# Patient Record
Sex: MALE | Race: OTHER | HISPANIC OR LATINO
[De-identification: names, ages, dates, MRNs, and addresses within clinical notes are randomized per-mention and may not be internally consistent; named-entity substitution may affect disease eponyms.]

---
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
## 2023-02-13 ENCOUNTER — TRANSCRIPTION ENCOUNTER (OUTPATIENT)
Age: 18
End: 2023-02-13

## 2023-02-13 ENCOUNTER — INPATIENT (INPATIENT)
Facility: HOSPITAL | Age: 18
LOS: 0 days | Discharge: HOME | DRG: 343 | End: 2023-02-14
Attending: STUDENT IN AN ORGANIZED HEALTH CARE EDUCATION/TRAINING PROGRAM | Admitting: STUDENT IN AN ORGANIZED HEALTH CARE EDUCATION/TRAINING PROGRAM
Payer: MEDICAID

## 2023-02-13 VITALS
DIASTOLIC BLOOD PRESSURE: 77 MMHG | OXYGEN SATURATION: 99 % | RESPIRATION RATE: 18 BRPM | HEART RATE: 80 BPM | SYSTOLIC BLOOD PRESSURE: 134 MMHG | TEMPERATURE: 97 F | WEIGHT: 315 LBS

## 2023-02-13 LAB
ALBUMIN SERPL ELPH-MCNC: 4 G/DL — SIGNIFICANT CHANGE UP (ref 3.3–5)
ALP SERPL-CCNC: 164 U/L — HIGH (ref 40–120)
ALT FLD-CCNC: 56 U/L — SIGNIFICANT CHANGE UP (ref 12–78)
ANION GAP SERPL CALC-SCNC: 6 MMOL/L — SIGNIFICANT CHANGE UP (ref 5–17)
APPEARANCE UR: CLEAR — SIGNIFICANT CHANGE UP
AST SERPL-CCNC: 27 U/L — SIGNIFICANT CHANGE UP (ref 15–37)
BASOPHILS # BLD AUTO: 0.07 K/UL — SIGNIFICANT CHANGE UP (ref 0–0.2)
BASOPHILS NFR BLD AUTO: 0.5 % — SIGNIFICANT CHANGE UP (ref 0–2)
BILIRUB SERPL-MCNC: 0.4 MG/DL — SIGNIFICANT CHANGE UP (ref 0.2–1.2)
BILIRUB UR-MCNC: NEGATIVE — SIGNIFICANT CHANGE UP
BUN SERPL-MCNC: 10 MG/DL — SIGNIFICANT CHANGE UP (ref 7–23)
CALCIUM SERPL-MCNC: 9.3 MG/DL — SIGNIFICANT CHANGE UP (ref 8.5–10.1)
CHLORIDE SERPL-SCNC: 106 MMOL/L — SIGNIFICANT CHANGE UP (ref 96–108)
CO2 SERPL-SCNC: 29 MMOL/L — SIGNIFICANT CHANGE UP (ref 22–31)
COLOR SPEC: YELLOW — SIGNIFICANT CHANGE UP
CREAT SERPL-MCNC: 0.7 MG/DL — SIGNIFICANT CHANGE UP (ref 0.5–1.3)
DIFF PNL FLD: NEGATIVE — SIGNIFICANT CHANGE UP
EGFR: 135 ML/MIN/1.73M2 — SIGNIFICANT CHANGE UP
EOSINOPHIL # BLD AUTO: 0.2 K/UL — SIGNIFICANT CHANGE UP (ref 0–0.5)
EOSINOPHIL NFR BLD AUTO: 1.4 % — SIGNIFICANT CHANGE UP (ref 0–6)
FLUAV AG NPH QL: SIGNIFICANT CHANGE UP
FLUBV AG NPH QL: SIGNIFICANT CHANGE UP
GLUCOSE SERPL-MCNC: 100 MG/DL — HIGH (ref 70–99)
GLUCOSE UR QL: NEGATIVE — SIGNIFICANT CHANGE UP
HCT VFR BLD CALC: 42.4 % — SIGNIFICANT CHANGE UP (ref 39–50)
HGB BLD-MCNC: 13.4 G/DL — SIGNIFICANT CHANGE UP (ref 13–17)
IMM GRANULOCYTES NFR BLD AUTO: 0.3 % — SIGNIFICANT CHANGE UP (ref 0–0.9)
KETONES UR-MCNC: NEGATIVE — SIGNIFICANT CHANGE UP
LEUKOCYTE ESTERASE UR-ACNC: NEGATIVE — SIGNIFICANT CHANGE UP
LIDOCAIN IGE QN: 75 U/L — SIGNIFICANT CHANGE UP (ref 73–393)
LYMPHOCYTES # BLD AUTO: 2.97 K/UL — SIGNIFICANT CHANGE UP (ref 1–3.3)
LYMPHOCYTES # BLD AUTO: 20.1 % — SIGNIFICANT CHANGE UP (ref 13–44)
MCHC RBC-ENTMCNC: 26.3 PG — LOW (ref 27–34)
MCHC RBC-ENTMCNC: 31.6 GM/DL — LOW (ref 32–36)
MCV RBC AUTO: 83.3 FL — SIGNIFICANT CHANGE UP (ref 80–100)
MONOCYTES # BLD AUTO: 0.85 K/UL — SIGNIFICANT CHANGE UP (ref 0–0.9)
MONOCYTES NFR BLD AUTO: 5.7 % — SIGNIFICANT CHANGE UP (ref 2–14)
NEUTROPHILS # BLD AUTO: 10.66 K/UL — HIGH (ref 1.8–7.4)
NEUTROPHILS NFR BLD AUTO: 72 % — SIGNIFICANT CHANGE UP (ref 43–77)
NITRITE UR-MCNC: NEGATIVE — SIGNIFICANT CHANGE UP
NRBC # BLD: 0 /100 WBCS — SIGNIFICANT CHANGE UP (ref 0–0)
PH UR: 5 — SIGNIFICANT CHANGE UP (ref 5–8)
PLATELET # BLD AUTO: 338 K/UL — SIGNIFICANT CHANGE UP (ref 150–400)
POTASSIUM SERPL-MCNC: 4.1 MMOL/L — SIGNIFICANT CHANGE UP (ref 3.5–5.3)
POTASSIUM SERPL-SCNC: 4.1 MMOL/L — SIGNIFICANT CHANGE UP (ref 3.5–5.3)
PROT SERPL-MCNC: 7.7 G/DL — SIGNIFICANT CHANGE UP (ref 6–8.3)
PROT UR-MCNC: NEGATIVE — SIGNIFICANT CHANGE UP
RBC # BLD: 5.09 M/UL — SIGNIFICANT CHANGE UP (ref 4.2–5.8)
RBC # FLD: 14.4 % — SIGNIFICANT CHANGE UP (ref 10.3–14.5)
RSV RNA NPH QL NAA+NON-PROBE: SIGNIFICANT CHANGE UP
SARS-COV-2 RNA SPEC QL NAA+PROBE: SIGNIFICANT CHANGE UP
SODIUM SERPL-SCNC: 141 MMOL/L — SIGNIFICANT CHANGE UP (ref 135–145)
SP GR SPEC: 1.01 — SIGNIFICANT CHANGE UP (ref 1.01–1.02)
UROBILINOGEN FLD QL: NEGATIVE — SIGNIFICANT CHANGE UP
WBC # BLD: 14.8 K/UL — HIGH (ref 3.8–10.5)
WBC # FLD AUTO: 14.8 K/UL — HIGH (ref 3.8–10.5)

## 2023-02-13 PROCEDURE — 76705 ECHO EXAM OF ABDOMEN: CPT | Mod: 26

## 2023-02-13 PROCEDURE — 99285 EMERGENCY DEPT VISIT HI MDM: CPT

## 2023-02-13 RX ORDER — SODIUM CHLORIDE 9 MG/ML
1000 INJECTION INTRAMUSCULAR; INTRAVENOUS; SUBCUTANEOUS ONCE
Refills: 0 | Status: COMPLETED | OUTPATIENT
Start: 2023-02-13 | End: 2023-02-13

## 2023-02-13 RX ORDER — FAMOTIDINE 10 MG/ML
20 INJECTION INTRAVENOUS ONCE
Refills: 0 | Status: COMPLETED | OUTPATIENT
Start: 2023-02-13 | End: 2023-02-13

## 2023-02-13 RX ORDER — ACETAMINOPHEN 500 MG
1000 TABLET ORAL ONCE
Refills: 0 | Status: COMPLETED | OUTPATIENT
Start: 2023-02-13 | End: 2023-02-13

## 2023-02-13 RX ADMIN — Medication 400 MILLIGRAM(S): at 19:48

## 2023-02-13 RX ADMIN — Medication 1000 MILLIGRAM(S): at 19:55

## 2023-02-13 RX ADMIN — SODIUM CHLORIDE 1000 MILLILITER(S): 9 INJECTION INTRAMUSCULAR; INTRAVENOUS; SUBCUTANEOUS at 19:55

## 2023-02-13 RX ADMIN — SODIUM CHLORIDE 1000 MILLILITER(S): 9 INJECTION INTRAMUSCULAR; INTRAVENOUS; SUBCUTANEOUS at 19:47

## 2023-02-13 RX ADMIN — Medication 1000 MILLIGRAM(S): at 20:00

## 2023-02-13 RX ADMIN — FAMOTIDINE 20 MILLIGRAM(S): 10 INJECTION INTRAVENOUS at 19:48

## 2023-02-13 NOTE — ED ADULT TRIAGE NOTE - INTERNATIONAL TRAVEL
Tobacco use is current.  Advised for 5 min on smoking cessation and discussed techniques to quit ( patch, pill ect )  Patient is precontemplative; encourage quit date consideration and follow up with me to readdress.  Advised smoke detectors in house, no smoking in bed and passive smoking education provided   No

## 2023-02-13 NOTE — ED PROVIDER NOTE - CLINICAL SUMMARY MEDICAL DECISION MAKING FREE TEXT BOX
Patient is a 18-year-old male who presents to the emergency room with a chief complaint of right-sided abdominal pain.  He reports that the pain began this morning after eating his cereal.  No significant past medical history.  Pain is significantly worsened throughout the day.  Denies any fevers chills nausea vomiting diarrhea chest pain or shortness of breath.  He reports he has had similar issues in the past that were related to gas and went away with [place] although there was no improvement with [place] at this time.  On exam patient is lying in bed in no acute distress normocephalic atraumatic pupils are equal round and reactive heart is regular rate lungs are clear to auscultation abdomen is soft with tenderness to palpation to the right lower quadrant guarding and rebound noted.  Patient presenting to the emergency room with a chief complaint of right-sided abdominal pain concern for intra-abdominal process high suspicion for possible appendicitis but must also include and consider possible colitis versus diverticulitis versus cholecystitis versus viral pathology.  Will obtain screening labs check UA urine culture obtain viral swab obtain right upper quadrant ultrasound and CT imaging of the abdomen pelvis.  We will hydrate and medicate for pain.  Results of labs noted patient with a mildly elevated white count no significant electrolyte abnormality urine does not appear to be infected viral swab negative.  Ultrasound reveals no evidence of acute cholecystitis CT imaging of the abdomen and pelvis reveals acute appendicitis.  Preop labs ordered along with antibiotics.  Patient updated surgery consulted will admit.

## 2023-02-13 NOTE — ED PROVIDER NOTE - DIFFERENTIAL DIAGNOSIS
Patient presenting to the emergency room with a chief complaint of right-sided abdominal pain concern for intra-abdominal process high suspicion for possible appendicitis but must also include and consider possible colitis versus diverticulitis versus cholecystitis versus viral pathology. Differential Diagnosis

## 2023-02-13 NOTE — ED PROVIDER NOTE - NS ED ATTENDING STATEMENT MOD
This was a shared visit with the LEE ANN. I reviewed and verified the documentation and independently performed the documented:

## 2023-02-13 NOTE — ED PROVIDER NOTE - OBJECTIVE STATEMENT
18 M c/o right sided abd pain x today after eating cereal this morning. Pain gradually worsening throughout the day. Denies prior abd surgery, f/c, n/v/d, urinary complaints. Pain is nonradiating.

## 2023-02-13 NOTE — ED ADULT NURSE NOTE - OBJECTIVE STATEMENT
Patient comes in A&OX4. patient comes in with complaints of R lower abdominal pain. patient started with the pain after eating breakfast.  pain has been getting worse over the course of the day. patient denies N/V/D.

## 2023-02-13 NOTE — ED ADULT NURSE NOTE - CAS TRG GENERAL AIRWAY, MLM
EXAM: CT Chest WITH contrast - Pulmonary Embolism Protocol



INDICATION: Pleuritic chest pain.



COMPARISON: None



TECHNIQUE:

Chest was scanned utilizing a multidetector helical scanner from the lung apex

through the level of the diaphragm after administration of IV contrast. Thin

section reconstructions were obtained with special concentration on the

pulmonary arteries. Coronal and sagittal reformations were obtained. Pulmonary

embolism protocol was performed.

           IV CONTRAST: 100 cc of Isovue-370.

           RADIATION DOSE: Total DLP: 512.7 mGy*cm



Dose modulation, iterative reconstruction, and/or weight based adjustment of

the mA/kV was utilized to reduce the radiation dose to as low as reasonably

achievable. 



           COMPLICATIONS: None



FINDINGS:



LINES/ TUBES: None.



PULMONARY ARTERIES: Main pulmonary artery measures 2.4 cm in diameter. No

filling defect is identified within the pulmonary arteries to the segmental

level. 



LUNGS AND AIRWAYS: The central airways are patent. No evidence of pneumonia or

pulmonary edema. There are bilateral calcified granulomas, for example in the

right middle lobe on series 3, image 61, and left lower lobe on image 76.

Partially calcified subpleural granuloma in the right middle lobe on image 76.

There is 4 mm triangular-shaped nodule along the right major fissure on image

30. Tiny scattered 2 mm noncalcified nodules, for example on subpleural nodule

in the right upper lobe on image 24. 



PLEURA: The pleural spaces are clear.



HEART AND MEDIASTINUM: The thyroid gland is normal.  No mediastinal or axillary

lymphadenopathy. There are calcified right hilar lymph nodes. The heart is

normal in size.. There is no pericardial effusion.  Trace amount of air within

the left brachiocephalic vein, likely from contrast injection.



UPPER ABDOMEN: Limited contrast-enhanced of the upper abdomen are unremarkable.



BONES: No acute osseous abnormality. No suspicious lytic or blastic lesions.



SOFT TISSUES: Unremarkable.



IMPRESSION: 

No evidence of pulmonary embolism to the level of the segmental pulmonary

arteries.



Sequela of prior granulomatous disease.



Tiny 2 mm noncalcified nodules are likely benign. A 4 mm triangular shaped

nodule along the right major fissure likely represents an intrafissural lymph

node. In a low risk patient, no further follow-up is recommended. If there is a

high risk for malignancy, an optional follow-up chest CT in 12 months may be

considered.



Signed by: Dr. Zaynab Correia MD on 4/15/2019 7:45 AM
Examination: Single AP view of the chest.



COMPARISON: None.



INDICATION: Chest pain

     

DISCUSSION:



Lines/tubes:  None.



Lungs:  The lungs are well inflated and clear. No pneumonia or pulmonary edema.



Pleura:  No pleural effusion or pneumothorax.



Heart and mediastinum:  The heart and the mediastinum are unremarkable.



Bones and soft tissues:  No acute bony abnormalities.  



IMPRESSION:

 

1.   No acute cardiopulmonary abnormalities.



Signed by: Dr. Andrew Palisch, M.D. on 4/14/2019 3:30 PM
Patent

## 2023-02-14 ENCOUNTER — TRANSCRIPTION ENCOUNTER (OUTPATIENT)
Age: 18
End: 2023-02-14

## 2023-02-14 VITALS
TEMPERATURE: 98 F | HEART RATE: 69 BPM | SYSTOLIC BLOOD PRESSURE: 110 MMHG | DIASTOLIC BLOOD PRESSURE: 47 MMHG | OXYGEN SATURATION: 97 % | RESPIRATION RATE: 20 BRPM

## 2023-02-14 DIAGNOSIS — K35.80 UNSPECIFIED ACUTE APPENDICITIS: ICD-10-CM

## 2023-02-14 LAB
APTT BLD: 33.8 SEC — SIGNIFICANT CHANGE UP (ref 27.5–35.5)
INR BLD: 1.2 RATIO — HIGH (ref 0.88–1.16)
PROTHROM AB SERPL-ACNC: 14.1 SEC — HIGH (ref 10.5–13.4)

## 2023-02-14 PROCEDURE — 93005 ELECTROCARDIOGRAM TRACING: CPT

## 2023-02-14 PROCEDURE — 93010 ELECTROCARDIOGRAM REPORT: CPT

## 2023-02-14 PROCEDURE — 76705 ECHO EXAM OF ABDOMEN: CPT

## 2023-02-14 PROCEDURE — 74177 CT ABD & PELVIS W/CONTRAST: CPT | Mod: 26,MA

## 2023-02-14 PROCEDURE — C1889: CPT

## 2023-02-14 PROCEDURE — 87637 SARSCOV2&INF A&B&RSV AMP PRB: CPT

## 2023-02-14 PROCEDURE — 85730 THROMBOPLASTIN TIME PARTIAL: CPT

## 2023-02-14 PROCEDURE — 88304 TISSUE EXAM BY PATHOLOGIST: CPT | Mod: 26

## 2023-02-14 PROCEDURE — 80053 COMPREHEN METABOLIC PANEL: CPT

## 2023-02-14 PROCEDURE — 88304 TISSUE EXAM BY PATHOLOGIST: CPT

## 2023-02-14 PROCEDURE — 96374 THER/PROPH/DIAG INJ IV PUSH: CPT

## 2023-02-14 PROCEDURE — 96375 TX/PRO/DX INJ NEW DRUG ADDON: CPT

## 2023-02-14 PROCEDURE — 86900 BLOOD TYPING SEROLOGIC ABO: CPT

## 2023-02-14 PROCEDURE — 85610 PROTHROMBIN TIME: CPT

## 2023-02-14 PROCEDURE — 81003 URINALYSIS AUTO W/O SCOPE: CPT

## 2023-02-14 PROCEDURE — 83690 ASSAY OF LIPASE: CPT

## 2023-02-14 PROCEDURE — 87086 URINE CULTURE/COLONY COUNT: CPT

## 2023-02-14 PROCEDURE — 86850 RBC ANTIBODY SCREEN: CPT

## 2023-02-14 PROCEDURE — 44970 LAPAROSCOPY APPENDECTOMY: CPT

## 2023-02-14 PROCEDURE — 74177 CT ABD & PELVIS W/CONTRAST: CPT | Mod: MA

## 2023-02-14 PROCEDURE — 85025 COMPLETE CBC W/AUTO DIFF WBC: CPT

## 2023-02-14 PROCEDURE — 86901 BLOOD TYPING SEROLOGIC RH(D): CPT

## 2023-02-14 PROCEDURE — 99285 EMERGENCY DEPT VISIT HI MDM: CPT | Mod: 25

## 2023-02-14 PROCEDURE — 36415 COLL VENOUS BLD VENIPUNCTURE: CPT

## 2023-02-14 PROCEDURE — 99223 1ST HOSP IP/OBS HIGH 75: CPT | Mod: 57

## 2023-02-14 DEVICE — STAPLER COVIDIEN TRI-STAPLE 45MM TAN RELOAD: Type: IMPLANTABLE DEVICE | Status: FUNCTIONAL

## 2023-02-14 RX ORDER — CEFOTETAN DISODIUM 1 G
3 VIAL (EA) INJECTION ONCE
Refills: 0 | Status: DISCONTINUED | OUTPATIENT
Start: 2023-02-14 | End: 2023-02-14

## 2023-02-14 RX ORDER — HYDROMORPHONE HYDROCHLORIDE 2 MG/ML
1 INJECTION INTRAMUSCULAR; INTRAVENOUS; SUBCUTANEOUS
Refills: 0 | Status: DISCONTINUED | OUTPATIENT
Start: 2023-02-14 | End: 2023-02-14

## 2023-02-14 RX ORDER — SODIUM CHLORIDE 9 MG/ML
1000 INJECTION, SOLUTION INTRAVENOUS
Refills: 0 | Status: DISCONTINUED | OUTPATIENT
Start: 2023-02-14 | End: 2023-02-14

## 2023-02-14 RX ORDER — PIPERACILLIN AND TAZOBACTAM 4; .5 G/20ML; G/20ML
3.38 INJECTION, POWDER, LYOPHILIZED, FOR SOLUTION INTRAVENOUS ONCE
Refills: 0 | Status: COMPLETED | OUTPATIENT
Start: 2023-02-14 | End: 2023-02-14

## 2023-02-14 RX ORDER — ONDANSETRON 8 MG/1
4 TABLET, FILM COATED ORAL ONCE
Refills: 0 | Status: DISCONTINUED | OUTPATIENT
Start: 2023-02-14 | End: 2023-02-14

## 2023-02-14 RX ORDER — CEFOTETAN DISODIUM 1 G
2 VIAL (EA) INJECTION ONCE
Refills: 0 | Status: DISCONTINUED | OUTPATIENT
Start: 2023-02-14 | End: 2023-02-14

## 2023-02-14 RX ORDER — HYDROMORPHONE HYDROCHLORIDE 2 MG/ML
0.5 INJECTION INTRAMUSCULAR; INTRAVENOUS; SUBCUTANEOUS
Refills: 0 | Status: DISCONTINUED | OUTPATIENT
Start: 2023-02-14 | End: 2023-02-14

## 2023-02-14 RX ORDER — OXYCODONE HYDROCHLORIDE 5 MG/1
1 TABLET ORAL
Qty: 5 | Refills: 0
Start: 2023-02-14

## 2023-02-14 RX ADMIN — SODIUM CHLORIDE 75 MILLILITER(S): 9 INJECTION, SOLUTION INTRAVENOUS at 20:00

## 2023-02-14 RX ADMIN — HYDROMORPHONE HYDROCHLORIDE 1 MILLIGRAM(S): 2 INJECTION INTRAMUSCULAR; INTRAVENOUS; SUBCUTANEOUS at 20:15

## 2023-02-14 RX ADMIN — PIPERACILLIN AND TAZOBACTAM 200 GRAM(S): 4; .5 INJECTION, POWDER, LYOPHILIZED, FOR SOLUTION INTRAVENOUS at 03:20

## 2023-02-14 RX ADMIN — HYDROMORPHONE HYDROCHLORIDE 1 MILLIGRAM(S): 2 INJECTION INTRAMUSCULAR; INTRAVENOUS; SUBCUTANEOUS at 20:30

## 2023-02-14 RX ADMIN — SODIUM CHLORIDE 190 MILLILITER(S): 9 INJECTION, SOLUTION INTRAVENOUS at 09:38

## 2023-02-14 NOTE — H&P ADULT - ASSESSMENT
19 yo M with no pmh presenting with a one day history of RLQ abdominal pain, found to have an acute appendicitis on CT without any free air or abscess. WBC 14.8

## 2023-02-14 NOTE — H&P ADULT - PROBLEM SELECTOR PLAN 1
- Admit to Dr. Jacobsen, [facility]  - NPO/IVF  - Abx: Zosyn  - Pain and Nausea control  - SCD/DVT ppx  - IS/OOB  - Add on to OR for laparoscopic appendectomy     Patient to be discussed with attending

## 2023-02-14 NOTE — ED ADULT NURSE REASSESSMENT NOTE - NS ED NURSE REASSESS COMMENT FT1
0950:  patient and parent were made aware that he is to remain NPO.  Unfortunately, we will not be able to give an estimate as to when the surgery will take place because this was an add on.  fluids infusing.  patient comfortable for the most part, some minor discomfort present.  the patient was again made aware that he will remain in the er until he is ready to go for surgery.  He has been NPO since yesterday AM.  the patient concurs with the plan of care.  call bell in place.  sonali wang.

## 2023-02-14 NOTE — H&P ADULT - NSHPLABSRESULTS_GEN_ALL_CORE
< from: CT Abdomen and Pelvis w/ IV Cont (02.14.23 @ 01:11) >    ACC: [id number] EXAM:  CT ABDOMEN AND PELVIS IC   ORDERED BY: JENNA NAILS     PROCEDURE DATE:  02/14/2023          INTERPRETATION:  CLINICAL INFORMATION: right abd pain.    PROCEDURE:  Helical axial images were obtained from the domes of the diaphragm   through the pubic symphysis following the administration of intravenous   contrast. Coronal and sagittal reformats were also obtained.    CONTRAST/COMPLICATIONS:  IV Contrast: Omnipaque 350  90 cc administered   10 cc discarded  Oral Contrast: NONE  Complications: None reported at time of study completion    COMPARISON: US 2/13/2023.    FINDINGS:    LOWER CHEST: Unremarkable.    LIVER: Decreased attenuation, likely fatty infiltration.  BILE DUCTS/GALLBLADDER: No intrahepatic or extrahepatic biliary   dilatation. No significant gallbladder edema.  PANCREAS: 9.5 x 4.5 x 13.5 cm (transverse x AP x CC) . No obvious lesion.  SPLEEN: Unremarkable.    ADRENALS: Unremarkable.  KIDNEYS/URETERS: No hydronephrosis, hydroureter or significant   perinephric stranding.  BLADDER: Partially distended. Prominent wall.  REPRODUCTIVE ORGANS: Unremarkable.    BOWEL: No bowel obstruction. Dilated (0.8 cm), fluid-filled appendix with   enhancing wall and surrounding stranding.  PERITONEUM: No organized fluid collection or free air. Scattered small   lymph nodes.  VESSELS: Normal caliber of the abdominal aorta.  RETROPERITONEUM: Subcentimeter lymph nodes without lymphadenopathy.  ABDOMINAL WALL/SOFT TISSUES: Small fat-containing umbilical hernia.  BONES: Degenerative changes of the spine. Minimal retrolisthesis of L5 on   S1.    IMPRESSION:    Acute appendicitis. No bowel obstruction, overlies fluid collection or   intraperitoneal free air.    Prominent bladder wall, which may be due to partial distention. Recommend   correlation with urinalysis to assess urinary tract infection.    Additional findings as described.    --- End of Report ---            JOLYNN DE MD; Attending Radiologist  This document has been electronically signed. Feb14 2023  3:04AM    < end of copied text >

## 2023-02-14 NOTE — ED ADULT NURSE REASSESSMENT NOTE - NS ED NURSE REASSESS COMMENT FT1
1720:  called OR, spoke to supervisor,  EDMUNDO 1900 at this time.  any new information received will be relayed to the patient / family.  the patient is doing well.  states he is staRving.  sonali wang.

## 2023-02-14 NOTE — ED ADULT NURSE REASSESSMENT NOTE - NS ED NURSE REASSESS COMMENT FT1
1150:  vitals recorded.  pain medication offered, he states he really doesn't have much pain at this time.  still awaiting eta for or.  sonali wang.

## 2023-02-14 NOTE — ASU DISCHARGE PLAN (ADULT/PEDIATRIC) - NS MD DC FALL RISK RISK
For information on Fall & Injury Prevention, visit: [email or web address] OR  [email or web address] OR  https://www.cdc.gov/steadi/patient.html

## 2023-02-14 NOTE — H&P ADULT - NSHPPHYSICALEXAM_GEN_ALL_CORE
Constitutional: AAOx3, no acute distress  HEENT: NCAT, airway patent  Cardiovascular: RRR, pulses present bilaterally  Respiratory: nonlabored breathing  Gastrointestinal: abdomen obese, soft, ttp in RLQ and periumbilical, non distended, no rebound or guarding  Neuro: no focal deficits Constitutional: No acute distress, resting comfortably  Neuro: Awake and alert  Psych: Calm, affect appropriate  HEENT: Anicteric, no conjunctival injection  Respiratory: Nonlabored, equal chest rise  Cardiovascular: Regular rate and rhythm  GI: abdomen obese, soft, ttp in RLQ and periumbilical, non distended, no rebound or guarding  Musculoskeletal: Warm, no edema, no obvious deformity  Vascular: palpable bilaterally

## 2023-02-14 NOTE — ED ADULT NURSE REASSESSMENT NOTE - NS ED NURSE REASSESS COMMENT FT1
0728:  received patient.   he is currently sleeping, family at bedside (also asleep).  patient respirations even / non labored.  awaiting bed assignment.  sonali wang.

## 2023-02-14 NOTE — H&P ADULT - HISTORY OF PRESENT ILLNESS
Mr. Butler is a 18 year old male with no pertinent past medical history presenting with a one day history of right lower quadrant abdominal pain. He reports that it started this morning after he had some cereal and it has gradually worsened throughout the day. He reports that certain movements/positions make his pain worse and he has not had any alleviating factors. He denies any nausea/vomiting, fever/chills, reports that he is passing gas and having regular bowel movements. Denies any chest pain, shortness of breath, dysuria or any other complaints. Denies any history of abdominal surgery.

## 2023-02-14 NOTE — ED ADULT NURSE REASSESSMENT NOTE - NS ED NURSE REASSESS COMMENT FT1
1324:  spoke to new from OR and gave report.  Surgeon will be ready approximately after 1630.  pre op check list will be completed by then.  patient made aware.  sonali wang.

## 2023-02-14 NOTE — ASU DISCHARGE PLAN (ADULT/PEDIATRIC) - CARE PROVIDER_API CALL
Marley Jacobsen)  Surgery; Surgical Critical Care  [address] [place] [address]  Phone: [phone]  Fax: [phone]  Follow Up Time:

## 2023-02-14 NOTE — ASU DISCHARGE PLAN (ADULT/PEDIATRIC) - ASU DC SPECIAL INSTRUCTIONSFT
Follow up with Dr. Jacobsen in 1-2 weeks. Call the office at the number below to schedule your appointment. You may shower; soap and water over incision sites. Do not scrub. Pat dry when done. No tub bathing or swimming until cleared. Keep incision sites out of the sun as scars will darken. Ambulate as tolerated, but no heavy lifting (>10lbs) or strenuous exercise. You may resume regular diet. You should be urinating at least 3-4x per day. Call the office if you experience increasing abdominal pain, nausea, vomiting, or temperature >101 F.    You may take Tylenol OTC 1000mg every 6 hours as needed for mild pain, no more than 4000mg per day. You can also take Ibuprofen OTC as needed for moderate pain 400mg every 6 hours, no more than 2400mg per day. You may also take oxycodone 5mg every 6 hours as needed for severe pain not to exceed 20mg in 24 hours.

## 2023-02-14 NOTE — ED ADULT NURSE REASSESSMENT NOTE - NS ED NURSE REASSESS COMMENT FT1
0821:  called surgical resident to find out if patient is requiring an admission.  Per resident, no.  He should not.  Should be able to go home after procedure.  I informed her that I was calling because bed board had called to advise me that they have no male bed, and if he is not going to require admission, then they will not look for a bed for him.  Per resident, he can stay in er, unsure as to when case will actually go off though, but should be able to go home following pacu.  sonali wang.

## 2023-02-14 NOTE — H&P ADULT - NS ATTEND AMEND GEN_ALL_CORE FT
I have personally seen and examined the patient.  I fully participated in the care of this patient.  I have made amendments to the documentation where necessary, and agree with the history, physical exam, impression/assessment, and plan as documented by the PA    Patient presenting with signs/symptoms of acute appendicitis  ABdomen soft, obese, tender in RLQ and McBurney's point  Labs demonstrate leukocytosis  CT images reviewed - dilated appendix with inflammation c/w appendicitis    Acute appendicitis  Discussed with patient and mother at bedside that patient has acute appendicitis and recommended laparoscopic appendectomy. I reviewed the risks/benefits/alternatives to surgery and all questions were answered. Both were agreeable to proceeding with lap appy.

## 2023-02-14 NOTE — ED ADULT NURSE REASSESSMENT NOTE - NS ED NURSE REASSESS COMMENT FT1
1818:  patient left for the OR.  all clothing removed, and given to Mom.  she is going to wait for him in waiting room.  vitals recorded.   fluids completed.  he left in stable condition, no distress and with all belongings.  sonali wang.

## 2023-02-15 LAB
CULTURE RESULTS: SIGNIFICANT CHANGE UP
SPECIMEN SOURCE: SIGNIFICANT CHANGE UP

## 2023-02-22 PROBLEM — Z00.00 ENCOUNTER FOR PREVENTIVE HEALTH EXAMINATION: Status: ACTIVE | Noted: 2023-02-22

## 2023-02-27 ENCOUNTER — APPOINTMENT (OUTPATIENT)
Dept: BARIATRICS | Facility: CLINIC | Age: 18
End: 2023-02-27
Payer: MEDICAID

## 2023-02-27 ENCOUNTER — NON-APPOINTMENT (OUTPATIENT)
Age: 18
End: 2023-02-27

## 2023-02-27 VITALS
DIASTOLIC BLOOD PRESSURE: 78 MMHG | WEIGHT: 315 LBS | OXYGEN SATURATION: 99 % | SYSTOLIC BLOOD PRESSURE: 116 MMHG | HEIGHT: 72 IN | TEMPERATURE: 96.7 F | HEART RATE: 84 BPM | BODY MASS INDEX: 42.66 KG/M2

## 2023-02-27 PROCEDURE — 99024 POSTOP FOLLOW-UP VISIT: CPT

## 2023-02-27 NOTE — DATA REVIEWED
[FreeTextEntry1] : ACCESSION No:  [id number]\par Patient:   MEDINA GARCES\par \par \par Accession:                             30- [id number]\par \par Collected Date/Time:                   2/14/2023 18:45 EST\par Received Date/Time:                    2/15/2023 07:50 EST\par \par Surgical Pathology Report - Auth (Verified)\par \par Specimen(s) Submitted\par 1  Appendix\par \par Final Diagnosis\par Appendix:\par -   Acute appendicitis with patchy periappendicitis.\par \par Verified by: JENNIFER LEGER\par (Electronic Signature)\par Reported on: 02/17/23 09:11 EST, [facility], [address],\[place] [address]\par _________________________________________________________________\par \par Clinical Information\par Procedure: Laparoscopic appendectomy\par \par \par Perioperative Diagnosis\par Acute appendicitis\par \par Postoperative Diagnosis\par Same\par \par Gross Description\par Received:  In formalin labeled  "appendix"\par Integrity:  Elongated, two portions\par Margins:  Stapled, inked black\par Length:  13.5 cm     Diameter:  0.5 up to 0.7 cm\par Serosa:   Gray-tan and dull. Most of the tubular appendix is grossly\par stripped of the serosal lining\par \par Mesoappendix:  1.2 cm  brown-yellow, lobulated and focally hemorrhagic\par Lumen:  0.6 cm, brown amorphous material\par Fecaliths:  Not Present\par Mucosa:  Tan-pink and unremarkable\par Wall:  0.2 cm\par Submitted:  Representative sections  in 2 cassettes:\par 1A: Proximal margin, bisected tip\par 1B: Cross-sections\par \par PEREZ Suazo (ASC) 02/16/2023 06:36 AM\par \par Disclaimer\par In addition to other data that may appear on the specimen containers, all\par labels have been inspected to confirm the presence of the patient's name\par and date of birth.\par Histological processing performed at [facility], Department of\par Pathology, [address] [place] [address].

## 2023-02-27 NOTE — HISTORY OF PRESENT ILLNESS
[de-identified] : 18M s/p lap appendectomy on 2/14/23 here for post op follow up.\par No nausea / vomiting, tolerating regular diet\par No abdominal pain, not requiring any pain medications\par Normal BMs\par No fever/chills \par

## 2023-02-27 NOTE — PHYSICAL EXAM
[Alert] : alert [Calm] : calm [de-identified] : sitting comfortably, no acute distress [de-identified] : Normocephalic, atraumatic. Anicteric. [de-identified] : Supple [de-identified] : Equal chest rise, nonlabored respirations. No audible wheezing. [de-identified] : Regular rate and rhythm. [de-identified] : Abdomen soft, nondistended, nontender. Well healing incisions. No drainage or erythema. [de-identified] : No obvious deformity, normal gait

## 2023-02-27 NOTE — ASSESSMENT
[FreeTextEntry1] : 18M status post lap appendectomy on 2/14/2023 recovering well\par -No heavy lifting for 6 weeks postop\par -Continue regular diet\par -Clear to return to work with restrictions, no heavy lifting for 6 weeks postop\par -Return to regular activities\par -Follow-up as needed, patient educated to call with questions or concerns

## 2023-09-25 NOTE — ED ADULT NURSE NOTE - NSHOSCREENINGQ1_ED_ALL_ED
No
Pt in no signs of acute distress. Pt's family present at bedside. Pt was endorsed to Tova RN for continuity of care.

## 2024-03-20 ENCOUNTER — EMERGENCY (EMERGENCY)
Facility: HOSPITAL | Age: 19
LOS: 1 days | Discharge: HOME | End: 2024-03-20
Attending: EMERGENCY MEDICINE | Admitting: EMERGENCY MEDICINE
Payer: MEDICAID

## 2024-03-20 VITALS
RESPIRATION RATE: 18 BRPM | DIASTOLIC BLOOD PRESSURE: 70 MMHG | OXYGEN SATURATION: 99 % | SYSTOLIC BLOOD PRESSURE: 130 MMHG | WEIGHT: 315 LBS | HEART RATE: 110 BPM | HEIGHT: 73 IN | TEMPERATURE: 101 F

## 2024-03-20 VITALS
TEMPERATURE: 99 F | HEART RATE: 98 BPM | OXYGEN SATURATION: 100 % | RESPIRATION RATE: 20 BRPM | DIASTOLIC BLOOD PRESSURE: 64 MMHG | SYSTOLIC BLOOD PRESSURE: 111 MMHG

## 2024-03-20 LAB
FLUAV AG NPH QL: SIGNIFICANT CHANGE UP
FLUBV AG NPH QL: DETECTED
RSV RNA NPH QL NAA+NON-PROBE: SIGNIFICANT CHANGE UP
S PYO DNA THROAT QL NAA+PROBE: SIGNIFICANT CHANGE UP
SARS-COV-2 RNA SPEC QL NAA+PROBE: SIGNIFICANT CHANGE UP

## 2024-03-20 PROCEDURE — 99284 EMERGENCY DEPT VISIT MOD MDM: CPT

## 2024-03-20 PROCEDURE — 87637 SARSCOV2&INF A&B&RSV AMP PRB: CPT

## 2024-03-20 PROCEDURE — 87798 DETECT AGENT NOS DNA AMP: CPT

## 2024-03-20 PROCEDURE — 93010 ELECTROCARDIOGRAM REPORT: CPT

## 2024-03-20 PROCEDURE — 93005 ELECTROCARDIOGRAM TRACING: CPT

## 2024-03-20 PROCEDURE — 99283 EMERGENCY DEPT VISIT LOW MDM: CPT

## 2024-03-20 PROCEDURE — 87651 STREP A DNA AMP PROBE: CPT

## 2024-03-20 RX ORDER — IBUPROFEN 200 MG
600 TABLET ORAL ONCE
Refills: 0 | Status: COMPLETED | OUTPATIENT
Start: 2024-03-20 | End: 2024-03-20

## 2024-03-20 RX ADMIN — Medication 600 MILLIGRAM(S): at 08:59

## 2024-03-20 NOTE — ED PROVIDER NOTE - OBJECTIVE STATEMENT
19-year-old male with no significant past medical history presents with cough, nasal congestion, body aches and sore throat over past 2 days.  Positive fever.  No acute vomiting or diarrhea.  Moderate p.o. intake.  No chest pain.  Patient occasionally feels some slight dyspnea, is not currently having dyspnea.  No recent travel or immobilization.  No known COVID exposure.  Patient previously vaccinated for COVID, not 2023 boosted.  No 2023 flu shot.  No aggravating or alleviating factors otherwise noted.  No other acute injury or complaints.

## 2024-03-20 NOTE — ED ADULT NURSE NOTE - OBJECTIVE STATEMENT
19y M pt from home to ED, c/o sore throat, fever, and generalized weakness starting suddenly yesterday. pt denies cp/sob, N/V/D, HA. medicated per orders. throat/nasal swab sent to lab.

## 2024-03-20 NOTE — ED PROVIDER NOTE - CARE PROVIDER_API CALL
SUPRIYA STRAUSS  [address]  [place] [id number]  Phone: [phone]  Fax: [phone]  Follow Up Time:

## 2024-03-20 NOTE — ED PROVIDER NOTE - ENMT, MLM
Airway patent, Nasal mucosa clear. Mouth with normal mucosa. Throat has no vesicles, no oropharyngeal exudates and uvula is midline. neck supple. no meningeal signs. Airway patent, Nasal mucosa clear. Mouth with normal mucosa. Throat has no vesicles, no oropharyngeal exudates and uvula is midline. neck supple. no meningeal signs. No pharyngeal edema/erythema.

## 2024-03-20 NOTE — ED PROVIDER NOTE - CONSTITUTIONAL, MLM
normal... Well appearing, awake, alert, oriented to person, place, time/situation and in no apparent distress. obesity.

## 2024-03-20 NOTE — ED ADULT NURSE NOTE - NSFALLUNIVINTERV_ED_ALL_ED
Bed/Stretcher in lowest position, wheels locked, appropriate side rails in place/Call bell, personal items and telephone in reach/Instruct patient to call for assistance before getting out of bed/chair/stretcher/Non-slip footwear applied when patient is off stretcher/[place] to call system/Physically safe environment - no spills, clutter or unnecessary equipment/Purposeful proactive rounding/Room/bathroom lighting operational, light cord in reach

## 2024-03-20 NOTE — ED PROVIDER NOTE - PROGRESS NOTE DETAILS
Discussed with Patent andFamily regarding the nature of the patient's infection.  At length discussion regarding the signs and symptoms of a persistent or worsening infection, the importance of close, prompt medical follow-up, and infection / fever precautions including when to immediately return to the emergency department.  An opportunity to ask questions was given and understanding of all instructions was verbalized.

## 2024-03-20 NOTE — ED PROVIDER NOTE - PATIENT PORTAL LINK FT
You can access the FollowMyHealth Patient Portal offered by [facility] by registering at the following website: [email or web address]. By joining [facility]’s FollowMyHealth portal, you will also be able to view your health information using other applications (apps) compatible with our system.

## 2024-03-20 NOTE — ED ADULT NURSE NOTE - CAS EDN DISCHARGE ASSESSMENT
AMA form signed by pt. Pt stated driving himself to [facility] now.    Alert and oriented to person, place and time

## 2024-03-20 NOTE — ED ADULT NURSE NOTE - PAIN: PRESENCE, MLM
nursing note reviewed. Constitutional:       Appearance: Normal appearance. Comments: Lost another 8 pounds   Cardiovascular:      Rate and Rhythm: Normal rate and regular rhythm. Heart sounds: Normal heart sounds. Pulmonary:      Effort: Pulmonary effort is normal.      Breath sounds: Normal breath sounds. Skin:     Findings: Rash present. Rash is macular and papular. Neurological:      Mental Status: She is alert and oriented to person, place, and time. Psychiatric:         Mood and Affect: Mood normal.         Assessment:       Diagnosis Orders   1. Atopic dermatitis, unspecified type        2. Feeling down        3. Obesity (BMI 30-39.9)        4. Essential hypertension             Plan:    REviewed good skin care--can try olive Oil  Qsymia refilled  Continue regular exercise  Continue healthy eating habits. Track BPs--may be able to stop the Atenolol soon. Return in about 6 months (around 5/2/2024) for HTN, Weight Management. No orders of the defined types were placed in this encounter. No orders of the defined types were placed in this encounter. Patient given educational materials - see patient instructions. Discussed use, benefit, and side effects of prescribed medications. All patient questions answered. Pt voiced understanding. Reviewed health maintenance. Instructed to continue current medications, diet and exercise. Patient agreed with treatment plan. Follow up as directed.      Electronically signed by Namrata Navas PA-C on 11/2/2023 at 8:36 AM
complains of pain/discomfort
n/a

## 2024-03-20 NOTE — ED PROVIDER NOTE - NSFOLLOWUPINSTRUCTIONS_ED_ALL_ED_FT
1. Follow-up with your Primary Medical Doctor. Call today for prompt follow-up.  2. Return to Emergency room for any worsening or persistent pain, weakness, fever, dizziness, passing out, difficulty breathing or any other concerning symptoms.  3. See attached instruction sheets for additional information, including information regarding signs and symptoms to look out for, reasons to seek immediate care and other important instructions.  4.  Plenty of fluids  5.  Motrin or Tylenol as needed for fever/achiness, with food 1. Follow-up with your Primary Medical Doctor. Call today for prompt follow-up.  2. Return to Emergency room for any worsening or persistent pain, weakness, fever, dizziness, passing out, difficulty breathing or any other concerning symptoms.  3. See attached instruction sheets for additional information, including information regarding signs and symptoms to look out for, reasons to seek immediate care and other important instructions.  4.  Plenty of fluids  5.  Motrin or Tylenol as needed for fever/achiness, with food  6.  Tamiflu twice daily for 5 days

## 2024-03-20 NOTE — ED PROVIDER NOTE - CLINICAL SUMMARY MEDICAL DECISION MAKING FREE TEXT BOX
URI symptoms over past 2 days, with generalized weakness and fever.  Will check flu/COVID/RSV, strep, outpatient follow-up

## 2024-03-22 ENCOUNTER — EMERGENCY (EMERGENCY)
Facility: HOSPITAL | Age: 19
LOS: 1 days | Discharge: HOME | End: 2024-03-22
Attending: EMERGENCY MEDICINE | Admitting: EMERGENCY MEDICINE
Payer: MEDICAID

## 2024-03-22 VITALS
SYSTOLIC BLOOD PRESSURE: 135 MMHG | TEMPERATURE: 99 F | RESPIRATION RATE: 19 BRPM | DIASTOLIC BLOOD PRESSURE: 79 MMHG | HEART RATE: 99 BPM | OXYGEN SATURATION: 97 %

## 2024-03-22 VITALS
HEIGHT: 73 IN | RESPIRATION RATE: 18 BRPM | HEART RATE: 85 BPM | WEIGHT: 315 LBS | SYSTOLIC BLOOD PRESSURE: 130 MMHG | TEMPERATURE: 100 F | OXYGEN SATURATION: 98 % | DIASTOLIC BLOOD PRESSURE: 90 MMHG

## 2024-03-22 LAB
ALBUMIN SERPL ELPH-MCNC: 4.2 G/DL — SIGNIFICANT CHANGE UP (ref 3.3–5)
ALP SERPL-CCNC: 139 U/L — HIGH (ref 40–120)
ALT FLD-CCNC: 30 U/L — SIGNIFICANT CHANGE UP (ref 12–78)
ANION GAP SERPL CALC-SCNC: 8 MMOL/L — SIGNIFICANT CHANGE UP (ref 5–17)
AST SERPL-CCNC: 23 U/L — SIGNIFICANT CHANGE UP (ref 15–37)
BASOPHILS # BLD AUTO: 0.02 K/UL — SIGNIFICANT CHANGE UP (ref 0–0.2)
BASOPHILS NFR BLD AUTO: 0.3 % — SIGNIFICANT CHANGE UP (ref 0–2)
BILIRUB SERPL-MCNC: 0.3 MG/DL — SIGNIFICANT CHANGE UP (ref 0.2–1.2)
BUN SERPL-MCNC: 9 MG/DL — SIGNIFICANT CHANGE UP (ref 7–23)
CALCIUM SERPL-MCNC: 8.9 MG/DL — SIGNIFICANT CHANGE UP (ref 8.5–10.1)
CHLORIDE SERPL-SCNC: 106 MMOL/L — SIGNIFICANT CHANGE UP (ref 96–108)
CO2 SERPL-SCNC: 27 MMOL/L — SIGNIFICANT CHANGE UP (ref 22–31)
CREAT SERPL-MCNC: 1 MG/DL — SIGNIFICANT CHANGE UP (ref 0.5–1.3)
EGFR: 110 ML/MIN/1.73M2 — SIGNIFICANT CHANGE UP
EOSINOPHIL # BLD AUTO: 0.01 K/UL — SIGNIFICANT CHANGE UP (ref 0–0.5)
EOSINOPHIL NFR BLD AUTO: 0.1 % — SIGNIFICANT CHANGE UP (ref 0–6)
GLUCOSE SERPL-MCNC: 104 MG/DL — HIGH (ref 70–99)
HCT VFR BLD CALC: 46.3 % — SIGNIFICANT CHANGE UP (ref 39–50)
HGB BLD-MCNC: 15.1 G/DL — SIGNIFICANT CHANGE UP (ref 13–17)
IMM GRANULOCYTES NFR BLD AUTO: 0.3 % — SIGNIFICANT CHANGE UP (ref 0–0.9)
LYMPHOCYTES # BLD AUTO: 2.03 K/UL — SIGNIFICANT CHANGE UP (ref 1–3.3)
LYMPHOCYTES # BLD AUTO: 29.5 % — SIGNIFICANT CHANGE UP (ref 13–44)
MCHC RBC-ENTMCNC: 27.8 PG — SIGNIFICANT CHANGE UP (ref 27–34)
MCHC RBC-ENTMCNC: 32.6 GM/DL — SIGNIFICANT CHANGE UP (ref 32–36)
MCV RBC AUTO: 85.3 FL — SIGNIFICANT CHANGE UP (ref 80–100)
MONOCYTES # BLD AUTO: 0.74 K/UL — SIGNIFICANT CHANGE UP (ref 0–0.9)
MONOCYTES NFR BLD AUTO: 10.8 % — SIGNIFICANT CHANGE UP (ref 2–14)
NEUTROPHILS # BLD AUTO: 4.05 K/UL — SIGNIFICANT CHANGE UP (ref 1.8–7.4)
NEUTROPHILS NFR BLD AUTO: 59 % — SIGNIFICANT CHANGE UP (ref 43–77)
NRBC # BLD: 0 /100 WBCS — SIGNIFICANT CHANGE UP (ref 0–0)
PLATELET # BLD AUTO: 253 K/UL — SIGNIFICANT CHANGE UP (ref 150–400)
POTASSIUM SERPL-MCNC: 4.1 MMOL/L — SIGNIFICANT CHANGE UP (ref 3.5–5.3)
POTASSIUM SERPL-SCNC: 4.1 MMOL/L — SIGNIFICANT CHANGE UP (ref 3.5–5.3)
PROT SERPL-MCNC: 7.8 G/DL — SIGNIFICANT CHANGE UP (ref 6–8.3)
RBC # BLD: 5.43 M/UL — SIGNIFICANT CHANGE UP (ref 4.2–5.8)
RBC # FLD: 14.6 % — HIGH (ref 10.3–14.5)
S PYO DNA THROAT QL NAA+PROBE: SIGNIFICANT CHANGE UP
SODIUM SERPL-SCNC: 141 MMOL/L — SIGNIFICANT CHANGE UP (ref 135–145)
WBC # BLD: 6.87 K/UL — SIGNIFICANT CHANGE UP (ref 3.8–10.5)
WBC # FLD AUTO: 6.87 K/UL — SIGNIFICANT CHANGE UP (ref 3.8–10.5)

## 2024-03-22 PROCEDURE — 99284 EMERGENCY DEPT VISIT MOD MDM: CPT

## 2024-03-22 PROCEDURE — 96375 TX/PRO/DX INJ NEW DRUG ADDON: CPT

## 2024-03-22 PROCEDURE — 71045 X-RAY EXAM CHEST 1 VIEW: CPT

## 2024-03-22 PROCEDURE — 99284 EMERGENCY DEPT VISIT MOD MDM: CPT | Mod: 25

## 2024-03-22 PROCEDURE — 96374 THER/PROPH/DIAG INJ IV PUSH: CPT

## 2024-03-22 PROCEDURE — 87798 DETECT AGENT NOS DNA AMP: CPT

## 2024-03-22 PROCEDURE — 85025 COMPLETE CBC W/AUTO DIFF WBC: CPT

## 2024-03-22 PROCEDURE — 94640 AIRWAY INHALATION TREATMENT: CPT

## 2024-03-22 PROCEDURE — 80053 COMPREHEN METABOLIC PANEL: CPT

## 2024-03-22 PROCEDURE — 36415 COLL VENOUS BLD VENIPUNCTURE: CPT

## 2024-03-22 PROCEDURE — 87651 STREP A DNA AMP PROBE: CPT

## 2024-03-22 PROCEDURE — 71045 X-RAY EXAM CHEST 1 VIEW: CPT | Mod: 26,59

## 2024-03-22 RX ORDER — IPRATROPIUM/ALBUTEROL SULFATE 18-103MCG
3 AEROSOL WITH ADAPTER (GRAM) INHALATION ONCE
Refills: 0 | Status: COMPLETED | OUTPATIENT
Start: 2024-03-22 | End: 2024-03-22

## 2024-03-22 RX ORDER — ACETAMINOPHEN 500 MG
1000 TABLET ORAL ONCE
Refills: 0 | Status: COMPLETED | OUTPATIENT
Start: 2024-03-22 | End: 2024-03-22

## 2024-03-22 RX ORDER — KETOROLAC TROMETHAMINE 30 MG/ML
15 SYRINGE (ML) INJECTION ONCE
Refills: 0 | Status: DISCONTINUED | OUTPATIENT
Start: 2024-03-22 | End: 2024-03-22

## 2024-03-22 RX ORDER — SODIUM CHLORIDE 9 MG/ML
1000 INJECTION INTRAMUSCULAR; INTRAVENOUS; SUBCUTANEOUS ONCE
Refills: 0 | Status: COMPLETED | OUTPATIENT
Start: 2024-03-22 | End: 2024-03-22

## 2024-03-22 RX ADMIN — Medication 3 MILLILITER(S): at 16:41

## 2024-03-22 RX ADMIN — Medication 15 MILLIGRAM(S): at 16:41

## 2024-03-22 RX ADMIN — SODIUM CHLORIDE 1000 MILLILITER(S): 9 INJECTION INTRAMUSCULAR; INTRAVENOUS; SUBCUTANEOUS at 16:41

## 2024-03-22 RX ADMIN — Medication 400 MILLIGRAM(S): at 16:41

## 2024-03-22 NOTE — ED PROVIDER NOTE - PHYSICAL EXAMINATION
no tonsilar exudate noted no tonsilar exudate noted throat with mild injection   no tonsilar swelling noted

## 2024-03-22 NOTE — ED PROVIDER NOTE - NSFOLLOWUPINSTRUCTIONS_ED_ALL_ED_FT
Return to the ED for any new or worsening symptoms  Take your medication as prescribed  Continue the Tamiflu  Alternate Tylenol and Motrin every 3 hours as needed for pain and fever  Make sure to remain hydrated  Follow-up with your PMD in 1 to 2 days for recheck  Salt water gargle for sore throat    Influenza, Adult  Influenza, also called "the flu," is a viral infection that mainly affects the respiratory tract. This includes the lungs, nose, and throat. The flu spreads easily from person to person (is contagious). It causes common cold symptoms, along with high fever and body aches.    What are the causes?  This condition is caused by the influenza virus. You can get the virus by:  Breathing in droplets that are in the air from an infected person's cough or sneeze.  Touching something that has the virus on it (has been contaminated) and then touching your mouth, nose, or eyes.  What increases the risk?  The following factors may make you more likely to get the flu:  Not washing or sanitizing your hands often.  Having close contact with many people during cold and flu season.  Touching your mouth, eyes, or nose without first washing or sanitizing your hands.  Not getting an annual flu shot.  You may have a higher risk for the flu, including serious problems, such as a lung infection (pneumonia), if you:  Are older than 65.  Are pregnant.  Have a weakened disease-fighting system (immune system). This includes people who have HIV or AIDS, are on chemotherapy, or are taking medicines that reduce (suppress) the immune system.  Have a long-term (chronic) illness, such as heart disease, kidney disease, diabetes, or lung disease.  Have a liver disorder.  Are severely overweight (morbidly obese).  Have anemia.  Have asthma.  What are the signs or symptoms?  Symptoms of this condition usually begin suddenly and last 4–14 days. These may include:  Fever and chills.  Headaches, body aches, or muscle aches.  Sore throat.  Cough.  Runny or stuffy (congested) nose.  Chest discomfort.  Poor appetite.  Weakness or fatigue.  Dizziness.  Nausea or vomiting.  How is this diagnosed?  This condition may be diagnosed based on:  Your symptoms and medical history.  A physical exam.  Swabbing your nose or throat and testing the fluid for the influenza virus.  How is this treated?  If the flu is diagnosed early, you can be treated with antiviral medicine that is given by mouth (orally) or through an IV. This can help reduce how severe the illness is and how long it lasts.    Taking care of yourself at home can help relieve symptoms. Your health care provider may recommend:  Taking over-the-counter medicines.  Drinking plenty of fluids.  In many cases, the flu goes away on its own. If you have severe symptoms or complications, you may be treated in a hospital.    Follow these instructions at home:  Activity    Rest as needed and get plenty of sleep.  Stay home from work or school as told by your health care provider. Unless you are visiting your health care provider, avoid leaving home until your fever has been gone for 24 hours without taking medicine.  Eating and drinking    Take an oral rehydration solution (ORS). This is a drink that is sold at pharmacies and retail stores.  Drink enough fluid to keep your urine pale yellow.  Drink clear fluids in small amounts as you are able. Clear fluids include water, ice chips, fruit juice mixed with water, and low-calorie sports drinks.  Eat bland, easy-to-digest foods in small amounts as you are able. These foods include bananas, applesauce, rice, lean meats, toast, and crackers.  Avoid drinking fluids that contain a lot of sugar or caffeine, such as energy drinks, regular sports drinks, and soda.  Avoid alcohol.  Avoid spicy or fatty foods.  General instructions        Take over-the-counter and prescription medicines only as told by your health care provider.  Use a cool mist humidifier to add humidity to the air in your home. This can make it easier to breathe.  When using a cool mist humidifier, clean it daily. Empty the water and replace it with clean water.  Cover your mouth and nose when you cough or sneeze.  Wash your hands with soap and water often and for at least 20 seconds, especially after you cough or sneeze. If soap and water are not available, use alcohol-based hand .  Keep all follow-up visits. This is important.  How is this prevented?    Get an annual flu shot. This is usually available in late summer, fall, or winter. Ask your health care provider when you should get your flu shot.  Avoid contact with people who are sick during cold and flu season. This is generally fall and winter.  Contact a health care provider if:  You develop new symptoms.  You have:  Chest pain.  Diarrhea.  A fever.  Your cough gets worse.  You produce more mucus.  You feel nauseous or you vomit.  Get help right away if you:  Develop shortness of breath or have difficulty breathing.  Have skin or nails that turn a bluish color.  Have severe pain or stiffness in your neck.  Develop a sudden headache or sudden pain in your face or ear.  Cannot eat or drink without vomiting.  These symptoms may represent a serious problem that is an emergency. Do not wait to see if the symptoms will go away. Get medical help right away. Call your local emergency services (911 in the [place] Do not drive yourself to the hospital.    Summary  Influenza, also called "the flu," is a viral infection that primarily affects your respiratory tract.  Symptoms of the flu usually begin suddenly and last 4–14 days.  Getting an annual flu shot is the best way to prevent getting the flu.  Stay home from work or school as told by your health care provider. Unless you are visiting your health care provider, avoid leaving home until your fever has been gone for 24 hours without taking medicine.  Keep all follow-up visits. This is important.  This information is not intended to replace advice given to you by your health care provider. Make sure you discuss any questions you have with your health care provider.

## 2024-03-22 NOTE — ED PROVIDER NOTE - PATIENT PORTAL LINK FT
You can access the FollowMyHealth Patient Portal offered by [facility] by registering at the following website: [email or web address]. By joining [facility]’s FollowMyHealth portal, you will also be able to view your health information using other applications (apps) compatible with our system.

## 2024-03-22 NOTE — ED PROVIDER NOTE - NEUROLOGICAL, MLM
Alert and oriented, no focal deficits, no motor or sensory deficits. Alert and oriented, no focal deficits, no motor or sensory deficits. FROM of neck no meningeal signs

## 2024-03-22 NOTE — ED PROVIDER NOTE - CLINICAL SUMMARY MEDICAL DECISION MAKING FREE TEXT BOX
Patient is a 25-year-old male who presents to the ED with a chief complaint of continued flulike illness.  Patient was seen in the emergency room on Wednesday and was diagnosed with influenza B.  Since Tuesday reporting subjective fevers diffuse body aches chills nonproductive cough nasal congestion and decreased appetite.  He was prescribed Tamiflu which she has been taking.  He is also tried Tylenol and tea but did not take any Tylenol or Motrin today.  He also reported some headache today.  Denies any nausea vomiting diarrhea chest pain or shortness of breath.  Patient presents today because he had continued symptoms despite taking the Tamiflu. Patient presenting to the emergency room with a chief complaint of flulike illness confirmed flu.  Symptoms likely secondary to his known viral etiology.  Will obtain screening labs to rule out electrolyte abnormality medicate for symptoms and monitor patient will likely be discharged home Patient is a 25-year-old male who presents to the ED with a chief complaint of continued flulike illness.  Patient was seen in the emergency room on Wednesday and was diagnosed with influenza B.  Since Tuesday reporting subjective fevers diffuse body aches chills nonproductive cough nasal congestion and decreased appetite.  He was prescribed Tamiflu which she has been taking.  He is also tried Tylenol and tea but did not take any Tylenol or Motrin today.  He also reported some headache today.  Denies any nausea vomiting diarrhea chest pain or shortness of breath.  Patient presents today because he had continued symptoms despite taking the Tamiflu. Patient presenting to the emergency room with a chief complaint of flulike illness confirmed flu.  Symptoms likely secondary to his known viral etiology.  Will obtain screening labs to rule out electrolyte abnormality medicate for symptoms and monitor patient will likely be discharged home. Results of labs reviewed patient with a normal white count no evidence of anemia strep negative CMP noted.  Independent review of chest x-ray reveals no acute infiltrate.  Patient remained stable can be discharged home advised on outpatient symptom control. Patient is a 25-year-old male who presents to the ED with a chief complaint of continued flulike illness.  Patient was seen in the emergency room on Wednesday and was diagnosed with influenza B.  Since Tuesday reporting subjective fevers diffuse body aches chills nonproductive cough nasal congestion and decreased appetite.  He was prescribed Tamiflu which she has been taking.  He is also tried Tylenol and tea but did not take any Tylenol or Motrin today.  He also reported some headache today.  Denies any nausea vomiting diarrhea chest pain or shortness of breath.  Patient presents today because he had continued symptoms despite taking the Tamiflu. Patient presenting to the emergency room with a chief complaint of flulike illness confirmed flu.  Symptoms likely secondary to his known viral etiology.  Will obtain screening labs to rule out electrolyte abnormality medicate for symptoms and monitor patient will likely be discharged home. Results of labs reviewed patient with a normal white count no evidence of anemia strep negative CMP noted.  Independent review of chest x-ray reveals no acute infiltrate.  Patient remained stable can be discharged home advised on outpatient symptom control. Overall reports that he feels improved

## 2024-03-22 NOTE — ED ADULT TRIAGE NOTE - CHIEF COMPLAINT QUOTE
C/O Flu like symptoms since this past tuesday, came here wednesday and given Tamiflu but is not feeling better as per Mother, NAD with spontaneous non labored breathing, V/S stable C/O Flu like symptoms since this past tuesday, came here wednesday and tested positive for influenza B, given Tamiflu but is not feeling better as per Mother, NAD with spontaneous non labored breathing, V/S stable

## 2024-03-22 NOTE — ED ADULT NURSE NOTE - CHIEF COMPLAINT QUOTE
C/O Flu like symptoms since this past tuesday, came here wednesday and tested positive for influenza B, given Tamiflu but is not feeling better as per Mother, NAD with spontaneous non labored breathing, V/S stable

## 2024-03-22 NOTE — ED PROVIDER NOTE - WET READ LAUNCH FT
Admit team at bedside speaking with patient. Patient awake, alert, oriented x 4. Resting in bed comfortably.    There are no Wet Read(s) to document.

## 2024-03-22 NOTE — ED PROVIDER NOTE - OBJECTIVE STATEMENT
Patient is a 25-year-old male who presents to the ED with a chief complaint of continued flulike illness.  Patient was seen in the emergency room on Wednesday and was diagnosed with influenza B.  Since Tuesday reporting subjective fevers diffuse body aches chills nonproductive cough nasal congestion and decreased appetite.  He was prescribed Tamiflu which she has been taking.  He is also tried Tylenol and tea but did not take any Tylenol or Motrin today.  He also reported some headache today.  Denies any nausea vomiting diarrhea chest pain or shortness of breath.  Patient presents today because he had continued symptoms despite taking the Tamiflu.

## 2024-03-22 NOTE — ED PROVIDER NOTE - DIFFERENTIAL DIAGNOSIS
Differential Diagnosis Patient presenting to the emergency room with a chief complaint of flulike illness confirmed flu.  Symptoms likely secondary to his known viral etiology.  Will obtain screening labs to rule out electrolyte abnormality medicate for symptoms and monitor patient will likely be discharged home

## 2024-03-22 NOTE — ED ADULT NURSE NOTE - OBJECTIVE STATEMENT
Pt c/o flu-like symptoms X4 days ago, went to ER X3 days ago, sent home with Tamiflu, told to return to ER with worsening symptoms. Pt now c/o decreased appetite, headaches and generalized chills/pain. No fever.

## 2024-04-29 NOTE — ED ADULT NURSE NOTE - BREATHING, MLM
Subjective   Patient ID: Tommy Richmond is a 85 y.o. male who presents for Follow-up (Pt is here today for 3 month fuv. Needs AWV scheduled in Sept. Dexa due in 2026).    HPI    Here for follow up.     Anxiety: under control with current regimen.     Urinary incontinence: takes oxybutynin.      HTN: under control with current medications. No chest pain. Has hx of SOB.     Reports that Alendronate helps him with bone pain.     No acute concerns expressed.     Review of Systems  ROS negative except discussed above in HPI.    Vitals:    04/29/24 1059   BP: 134/80   Pulse: 72   SpO2: 96%     Objective   Physical Exam  Constitutional:       Appearance: Normal appearance.   Cardiovascular:      Rate and Rhythm: Normal rate and regular rhythm.   Pulmonary:      Effort: Pulmonary effort is normal.      Breath sounds: Normal breath sounds.   Musculoskeletal:      Cervical back: Normal range of motion and neck supple.   Lymphadenopathy:      Cervical: No cervical adenopathy.   Neurological:      Mental Status: He is alert.           Assessment/Plan   Tommy was seen today for follow-up.  Diagnoses and all orders for this visit:  Anxiety (Primary)  Depression, unspecified depression type  Hypertension, unspecified type      Follow up in 6 month for wellness visit.          Brandan Mcclendon MD MPH  
Spontaneous, unlabored and symmetrical

## 2025-08-04 ENCOUNTER — NON-APPOINTMENT (OUTPATIENT)
Age: 20
End: 2025-08-04

## 2025-08-04 ENCOUNTER — APPOINTMENT (OUTPATIENT)
Age: 20
End: 2025-08-04

## 2025-08-04 VITALS — WEIGHT: 315 LBS | HEIGHT: 74 IN | BODY MASS INDEX: 40.43 KG/M2

## 2025-08-04 DIAGNOSIS — S83.004A UNSPECIFIED DISLOCATION OF RIGHT PATELLA, INITIAL ENCOUNTER: ICD-10-CM

## 2025-08-04 DIAGNOSIS — S82.001A UNSPECIFIED FRACTURE OF RIGHT PATELLA, INITIAL ENCOUNTER FOR CLOSED FRACTURE: ICD-10-CM

## 2025-08-04 PROCEDURE — 73560 X-RAY EXAM OF KNEE 1 OR 2: CPT | Mod: RT

## 2025-08-04 PROCEDURE — 99203 OFFICE O/P NEW LOW 30 MIN: CPT | Mod: 25

## 2025-08-06 VITALS — BODY MASS INDEX: 40.43 KG/M2 | HEIGHT: 74 IN | WEIGHT: 315 LBS

## 2025-08-06 DIAGNOSIS — Z78.9 OTHER SPECIFIED HEALTH STATUS: ICD-10-CM

## 2025-08-06 PROBLEM — S83.004A DISLOCATION OF RIGHT PATELLA, INITIAL ENCOUNTER: Status: ACTIVE | Noted: 2025-08-04

## 2025-08-11 DIAGNOSIS — Z82.61 FAMILY HISTORY OF ARTHRITIS: ICD-10-CM

## 2025-08-13 DIAGNOSIS — S83.242A OTHER TEAR OF MEDIAL MENISCUS, CURRENT INJURY, LEFT KNEE, INITIAL ENCOUNTER: ICD-10-CM

## 2025-08-20 ENCOUNTER — APPOINTMENT (OUTPATIENT)
Dept: ORTHOPEDIC SURGERY | Facility: CLINIC | Age: 20
End: 2025-08-20
Payer: COMMERCIAL

## 2025-08-20 DIAGNOSIS — S83.004A UNSPECIFIED DISLOCATION OF RIGHT PATELLA, INITIAL ENCOUNTER: ICD-10-CM

## 2025-08-20 PROCEDURE — 99214 OFFICE O/P EST MOD 30 MIN: CPT

## 2025-09-10 ENCOUNTER — APPOINTMENT (OUTPATIENT)
Dept: ORTHOPEDIC SURGERY | Facility: CLINIC | Age: 20
End: 2025-09-10
Payer: COMMERCIAL

## 2025-09-10 VITALS — HEIGHT: 74 IN | BODY MASS INDEX: 40.43 KG/M2 | WEIGHT: 315 LBS

## 2025-09-10 VITALS — WEIGHT: 218 LBS | HEIGHT: 71 IN | BODY MASS INDEX: 30.52 KG/M2

## 2025-09-10 DIAGNOSIS — S83.004A UNSPECIFIED DISLOCATION OF RIGHT PATELLA, INITIAL ENCOUNTER: ICD-10-CM

## 2025-09-10 PROCEDURE — 99213 OFFICE O/P EST LOW 20 MIN: CPT

## (undated) DEVICE — TUBING STRYKER PNEUMOSURE HI FLOW INSUFFLATOR

## (undated) DEVICE — GLV 6.5 PROTEXIS (WHITE)

## (undated) DEVICE — TROCAR COVIDIEN VERSAONE BLADED SMOOTH 11MM STANDARD

## (undated) DEVICE — BLADE SCALPEL SAFETYLOCK #15

## (undated) DEVICE — ELCTR BOVIE PENCIL SMOKE EVACUATION

## (undated) DEVICE — VENODYNE/SCD SLEEVE CALF MEDIUM

## (undated) DEVICE — PACK GENERAL LAPAROSCOPY

## (undated) DEVICE — D HELP - CLEARVIEW CLEARIFY SYSTEM

## (undated) DEVICE — TROCAR COVIDIEN VERSAONE FIXATION CANNULA 5MM

## (undated) DEVICE — Device

## (undated) DEVICE — WARMING BLANKET UPPER ADULT

## (undated) DEVICE — PLV-STRYKER LAPAROSCOPE 5MM 0 DEGREE: Type: DURABLE MEDICAL EQUIPMENT

## (undated) DEVICE — SOL IRR BAG NS 0.9% 3000ML

## (undated) DEVICE — SOL IRR POUR H2O 1000ML

## (undated) DEVICE — DRAPE 3/4 SHEET W REINFORCEMENT 56X77"

## (undated) DEVICE — LIGASURE MARYLAND 37CM

## (undated) DEVICE — SUT POLYSORB 0 30" GU-46

## (undated) DEVICE — ENDOCATCH 10MM SPECIMEN POUCH

## (undated) DEVICE — SUT VICRYL 0 18" ENDOLOOP LIGATURE

## (undated) DEVICE — PLV-SCD MACHINE: Type: DURABLE MEDICAL EQUIPMENT

## (undated) DEVICE — VENODYNE/SCD SLEEVE CALF LARGE

## (undated) DEVICE — DRAPE TOWEL BLUE 17" X 24"

## (undated) DEVICE — SOL IRR POUR NS 0.9% 1000ML

## (undated) DEVICE — PLV-STRYKER LAPAROSCOPE 5MM 30 DEGREE: Type: DURABLE MEDICAL EQUIPMENT

## (undated) DEVICE — STAPLER COVIDIEN ENDO GIA STANDARD HANDLE

## (undated) DEVICE — GLV 7.5 PROTEXIS (WHITE)

## (undated) DEVICE — PLV/PSP-SUCTION IRRIGATOR STRYKER: Type: DURABLE MEDICAL EQUIPMENT

## (undated) DEVICE — SUT MONOCRYL 4-0 27" PS-2 UNDYED

## (undated) DEVICE — DRSG DERMABOND 0.7ML

## (undated) DEVICE — SMOKE EVACUTATION SYS LAPROSCOPIC AC/PA

## (undated) DEVICE — TROCAR COVIDIEN VERSAPORT BLADELESS OPTICAL 5MM STANDARD